# Patient Record
Sex: FEMALE | Race: WHITE | NOT HISPANIC OR LATINO | Employment: FULL TIME | URBAN - METROPOLITAN AREA
[De-identification: names, ages, dates, MRNs, and addresses within clinical notes are randomized per-mention and may not be internally consistent; named-entity substitution may affect disease eponyms.]

---

## 2017-01-11 ENCOUNTER — ALLSCRIPTS OFFICE VISIT (OUTPATIENT)
Dept: OTHER | Facility: OTHER | Age: 32
End: 2017-01-11

## 2017-01-17 ENCOUNTER — LAB CONVERSION - ENCOUNTER (OUTPATIENT)
Dept: OTHER | Facility: OTHER | Age: 32
End: 2017-01-17

## 2017-01-17 LAB
ADDITIONAL INFORMATION (HISTORICAL): NORMAL
ADEQUACY: (HISTORICAL): NORMAL
CLINICAL COMMENT (HISTORICAL): NORMAL
COMMENT (HISTORICAL): NORMAL
CYTOTECHNOLOGIST: (HISTORICAL): NORMAL
INTERPRETATION (HISTORICAL): NORMAL
LMP (HISTORICAL): NORMAL
PREV. BX: (HISTORICAL): NORMAL
PREV. PAP (HISTORICAL): NORMAL
REVIEWED BY (HISTORICAL): NORMAL
SOURCE (HISTORICAL): NORMAL

## 2018-01-15 VITALS
BODY MASS INDEX: 31.03 KG/M2 | WEIGHT: 186.25 LBS | SYSTOLIC BLOOD PRESSURE: 130 MMHG | HEIGHT: 65 IN | RESPIRATION RATE: 16 BRPM | DIASTOLIC BLOOD PRESSURE: 82 MMHG | HEART RATE: 72 BPM

## 2018-04-04 ENCOUNTER — OFFICE VISIT (OUTPATIENT)
Dept: OBGYN CLINIC | Facility: CLINIC | Age: 33
End: 2018-04-04
Payer: COMMERCIAL

## 2018-04-04 VITALS
HEART RATE: 77 BPM | BODY MASS INDEX: 30.05 KG/M2 | WEIGHT: 187 LBS | SYSTOLIC BLOOD PRESSURE: 109 MMHG | DIASTOLIC BLOOD PRESSURE: 79 MMHG | HEIGHT: 66 IN

## 2018-04-04 DIAGNOSIS — Z30.41 ENCOUNTER FOR SURVEILLANCE OF CONTRACEPTIVE PILLS: ICD-10-CM

## 2018-04-04 DIAGNOSIS — Z01.419 ENCOUNTER FOR GYNECOLOGICAL EXAMINATION WITHOUT ABNORMAL FINDING: Primary | ICD-10-CM

## 2018-04-04 DIAGNOSIS — A60.00 GENITAL HERPES SIMPLEX, UNSPECIFIED SITE: ICD-10-CM

## 2018-04-04 PROCEDURE — 99395 PREV VISIT EST AGE 18-39: CPT | Performed by: NURSE PRACTITIONER

## 2018-04-04 RX ORDER — NORETHINDRONE ACETATE AND ETHINYL ESTRADIOL 1MG-20(21)
1 KIT ORAL DAILY
Qty: 28 TABLET | Refills: 11 | Status: SHIPPED | OUTPATIENT
Start: 2018-04-04 | End: 2018-05-04

## 2018-04-04 RX ORDER — NORETHINDRONE ACETATE AND ETHINYL ESTRADIOL 1MG-20(21)
KIT ORAL
COMMUNITY
Start: 2017-01-11 | End: 2018-04-04 | Stop reason: SDUPTHER

## 2018-04-04 RX ORDER — VALACYCLOVIR HYDROCHLORIDE 500 MG/1
500 TABLET, FILM COATED ORAL DAILY
Qty: 30 TABLET | Refills: 11 | Status: SHIPPED | OUTPATIENT
Start: 2018-04-04 | End: 2018-05-04

## 2018-04-04 NOTE — PROGRESS NOTES
ASSESSMENT & PLAN: Briseyda Rapp is a 34 y o  G3 [id number] with normal gynecologic exam     1   Routine well woman exam done today  2  Pap and HPV:  The patient's last pap was 1/17/2017 and was negative  Pap and cotesting was not done today  Current ASCCP Guidelines reviewed  In 2020 pt will need pap and cotest done  3  The following were reviewed in today's visit: breast self exam, use and side effects of OCPs, adequate intake of calcium and vitamin D, exercise and healthy diet  4  Desires to continue oCP's, Junel 1/20, 1 pack with 11 refill sent to pharmacy  5   HSV, right labia, 1 small lesion that is healing,  patient is on suppression with Valtrex 500 mg daily  Prescription renewed  CC:  Annual Gynecologic Examination    HPI: Briseyda Rapp is a 34 y  o G3  who presents for annual gynecologic examination  She is on OCPs and desires to continue  She ran out of pills needs to restart, will restart the Sunday  Currently she is not sexually active but desires to be with her new partner  Reviewed consistent condom use  Currently has an HSV outbreak with 1 small lesion  She has a total of 3 lifetime outbreaks  Happy with daily suppression and wants to continue this treatment  Reports PMS symptoms the week before menses  Will try vitamin B complex and adequate calcium 1000mgs I diet or supplement and vitamin-D 3 1000 units daily  Patient will also increase her exercise  with  warmer weather approaching  Health Maintenance:    She wears her seatbelt routinely  She does perform irregular monthly self breast exams  She feels safe at home  Past Medical History:   Diagnosis Date    Broken arm     Herpes        Past Surgical History:   Procedure Laterality Date    APPENDECTOMY         Past OB/Gyn History:  OB History     No data available        Pt does not have menstrual issues  Regular with OCPs   History of sexually transmitted infection:Yes  History of abnormal pap smears:  No Patient is not currently sexually active  heterosexual   The current method of family planning is OCP (estrogen/progesterone)  Family History   Problem Relation Age of Onset    Heart defect Mother     Rheumatic fever Mother        Social History:  Social History     Social History    Marital status: Single     Spouse name: N/A    Number of children: N/A    Years of education: N/A     Occupational History    Not on file  Social History Main Topics    Smoking status: Never Smoker    Smokeless tobacco: Former User    Alcohol use Yes      Comment: occ    Drug use: No    Sexual activity: Not Currently     Partners: Male     Birth control/ protection: Pill     Other Topics Concern    Not on file     Social History Narrative    No narrative on file     Presently lives with her child  Patient is single  Patient is currently employed     Allergies   Allergen Reactions    Augmentin [Amoxicillin-Pot Clavulanate] Rash    Latex Rash    Sulfa Antibiotics Rash    Wound Dressing Adhesive Rash         Current Outpatient Prescriptions:     norethindrone-ethinyl estradiol (JUNEL FE 1/20) 1-20 MG-MCG per tablet, Take by mouth, Disp: , Rfl:     valACYclovir (VALTREX) 1,000 mg tablet, Take 1 tablet (1,000 mg total) by mouth 3 (three) times a day , Disp: 21 tablet, Rfl: 0      Review of Systems  Constitutional :no fever, feels well, no tiredness, no recent weight gain or loss  ENT: no ear ache, no loss of hearing, no nosebleeds or nasal discharge, no sore throat or hoarseness  Cardiovascular: no complaints of slow or fast heart beat, no chest pain, no palpitations, no leg claudication or lower extremity edema    Respiratory: no complaints of shortness of shortness of breath, no WINSTON  Breasts:no complaints of breast pain, breast lump, or nipple discharge  Gastrointestinal: no complaints of abdominal pain, constipation, nausea, vomiting, or diarrhea or bloody stools  Genitourinary : no complaints of dysuria, incontinence, pelvic pain, no dysmenorrhea, vaginal discharge or abnormal vaginal bleeding and as noted in HPI  Integumentary: no complaints of skin rash or lesion, itching or dry skin  Neurological: no complaints of headache, no confusion, no numbness or tingling, no dizziness or fainting    Objective      /79 (BP Location: Left arm, Patient Position: Sitting, Cuff Size: Adult)   Pulse 77   Ht 5' 6" (1 676 m)   Wt 84 8 kg (187 lb)   LMP 03/28/2018   Breastfeeding? No   BMI 30 18 kg/m²   General:   appears stated age, cooperative, alert normal mood and affect   Neck: normal, supple,trachea midline, no masses   Heart: regular rate and rhythm, S1, S2 normal, no murmur, click, rub or gallop   Lungs: clear to auscultation bilaterally   Breasts: normal appearance, no masses or tenderness, Inspection negative   Abdomen: soft, non-tender, without masses or organomegaly   Vulva: 1 small lesion on Rt labia that is healing   Vagina: normal vagina, no discharge, exudate, lesion, or erythema   Urethra: normal   Cervix: Normal, no discharge     Uterus: normal size, contour, position, consistency, mobility, non-tender, anteverted and mobile   Adnexa: normal adnexa

## 2018-08-09 ENCOUNTER — TELEPHONE (OUTPATIENT)
Dept: OBGYN CLINIC | Facility: CLINIC | Age: 33
End: 2018-08-09

## 2018-08-09 NOTE — TELEPHONE ENCOUNTER
Online message received from patient requesting a prescription for a UTI  Patient called , message left for patient informing her that we can see her today and to please call to schedule if she is still in need

## 2018-09-03 ENCOUNTER — OFFICE VISIT (OUTPATIENT)
Dept: URGENT CARE | Age: 33
End: 2018-09-03
Payer: COMMERCIAL

## 2018-09-03 VITALS
SYSTOLIC BLOOD PRESSURE: 120 MMHG | OXYGEN SATURATION: 99 % | HEIGHT: 65 IN | RESPIRATION RATE: 18 BRPM | DIASTOLIC BLOOD PRESSURE: 60 MMHG | HEART RATE: 82 BPM | TEMPERATURE: 98 F | WEIGHT: 188 LBS | BODY MASS INDEX: 31.32 KG/M2

## 2018-09-03 DIAGNOSIS — J06.9 UPPER RESPIRATORY TRACT INFECTION, UNSPECIFIED TYPE: Primary | ICD-10-CM

## 2018-09-03 DIAGNOSIS — J40 BRONCHITIS: ICD-10-CM

## 2018-09-03 PROCEDURE — 99213 OFFICE O/P EST LOW 20 MIN: CPT | Performed by: FAMILY MEDICINE

## 2018-09-03 RX ORDER — AZITHROMYCIN 250 MG/1
TABLET, FILM COATED ORAL
Qty: 6 TABLET | Refills: 0 | Status: SHIPPED | OUTPATIENT
Start: 2018-09-03 | End: 2018-09-08

## 2018-09-03 NOTE — PROGRESS NOTES
[email or web address]    NAME: Zay Bobby is a 34 y o  female  : 1985    MRN: [id number]  DATE: September 3, 2018  TIME: 11:14 AM    Assessment and Plan   Upper respiratory tract infection, unspecified type [J06 9]  1  Upper respiratory tract infection, unspecified type     2  Bronchitis  azithromycin (ZITHROMAX) 250 mg tablet       Patient Instructions     Patient Instructions   1  Take antibiotic as directed  2  Recommend daily probiotic while on antibiotic or eat yogurt with live cultures daily while on antibiotic  3  Push fluids  4  Plan follow up with your PCP as directed  Consider Allegra D for allergy / post nasal drip symptoms  Continue Tylenol or Advil for fever  Chief Complaint     Chief Complaint   Patient presents with    Fever     symptoms started about a week ago   Cough       History of Present Illness   Zay Bobby presents to the clinic c/o  37-year-old female with says she has been running a fever for over week  Cough started a couple days ago  Some postnasal drip  Some runny nose  She does have a history of herpetic lesions and sometimes that will cause her to run a fever but she has had no outbreaks  She works full-time in a   She has a 3year-old daughter  She is a nonsmoker  Review of Systems   Review of Systems   Constitutional: Positive for activity change, appetite change, chills and fever  HENT: Positive for congestion, postnasal drip and rhinorrhea  Eyes: Negative  Respiratory: Positive for cough  Negative for chest tightness, shortness of breath, wheezing and stridor  Cardiovascular: Positive for chest pain  Negative for palpitations and leg swelling         Current Medications     Long-Term Prescriptions   Medication Sig Dispense Refill    norethindrone-ethinyl estradiol (JUNEL FE 1/20) 1-20 MG-MCG per tablet Take 1 tablet by mouth daily for 30 days 28 tablet 11    valACYclovir (VALTREX) 500 mg tablet Take 1 tablet (500 mg total) by mouth daily for 30 days 30 tablet 11       Current Allergies     Allergies as of 2018 - Reviewed 2018   Allergen Reaction Noted    Augmentin [amoxicillin-pot clavulanate] Rash 2016    Latex Rash 2013    Sulfa antibiotics Rash 2013    Wound dressing adhesive Rash 2013          The following portions of the patient's history were reviewed and updated as appropriate: allergies, current medications, past family history, past medical history, past social history, past surgical history and problem list   Past Medical History:   Diagnosis Date    Broken arm     Herpes      Past Surgical History:   Procedure Laterality Date    APPENDECTOMY       SECTION  2014     Family History   Problem Relation Age of Onset    Heart defect Mother     Rheumatic fever Mother        Objective   /60 (BP Location: Left arm, Patient Position: Sitting, Cuff Size: Standard)   Pulse 82   Temp 98 °F (36 7 °C) (Temporal)   Resp 18   Ht 5' 5" (1 651 m)   Wt 85 3 kg (188 lb)   SpO2 99%   BMI 31 28 kg/m²        Physical Exam     Physical Exam   Constitutional: She is oriented to person, place, and time  She appears well-developed and well-nourished  No distress  Appears acutely ill but in no acute distress   HENT:   Head: Normocephalic and atraumatic  Right Ear: External ear normal    Left Ear: External ear normal    Cobblestoning of posterior pharynx with patchy redness  No exudate or fetid breath  Nasal turbinates red and edematous  No purulent mucus   Eyes: Conjunctivae and EOM are normal  Pupils are equal, round, and reactive to light  Right eye exhibits no discharge  Left eye exhibits no discharge  Neck: Normal range of motion  Neck supple  Cardiovascular: Normal rate, regular rhythm and normal heart sounds  Exam reveals no gallop and no friction rub  No murmur heard    Pulmonary/Chest: Effort normal and breath sounds normal  No respiratory distress  She has no wheezes  She has no rales  Lymphadenopathy:     She has no cervical adenopathy  Neurological: She is alert and oriented to person, place, and time  Skin: Skin is warm and dry  No rash noted  She is not diaphoretic  Psychiatric: She has a normal mood and affect  Nursing note and vitals reviewed

## 2018-09-03 NOTE — PATIENT INSTRUCTIONS
1  Take antibiotic as directed  2  Recommend daily probiotic while on antibiotic or eat yogurt with live cultures daily while on antibiotic  3  Push fluids  4  Plan follow up with your PCP as directed  Consider Allegra D for allergy / post nasal drip symptoms  Continue Tylenol or Advil for fever

## 2018-10-31 ENCOUNTER — OFFICE VISIT (OUTPATIENT)
Dept: URGENT CARE | Age: 33
End: 2018-10-31
Payer: COMMERCIAL

## 2018-10-31 VITALS
HEART RATE: 71 BPM | DIASTOLIC BLOOD PRESSURE: 72 MMHG | SYSTOLIC BLOOD PRESSURE: 138 MMHG | OXYGEN SATURATION: 98 % | HEIGHT: 66 IN | TEMPERATURE: 98.2 F | WEIGHT: 190 LBS | BODY MASS INDEX: 30.53 KG/M2 | RESPIRATION RATE: 18 BRPM

## 2018-10-31 DIAGNOSIS — H10.32 ACUTE BACTERIAL CONJUNCTIVITIS OF LEFT EYE: Primary | ICD-10-CM

## 2018-10-31 PROCEDURE — 99213 OFFICE O/P EST LOW 20 MIN: CPT | Performed by: FAMILY MEDICINE

## 2018-10-31 RX ORDER — TOBRAMYCIN 3 MG/ML
1 SOLUTION/ DROPS OPHTHALMIC
Qty: 1 BOTTLE | Refills: 0 | Status: SHIPPED | OUTPATIENT
Start: 2018-10-31

## 2018-10-31 NOTE — PROGRESS NOTES
[email or web address]        NAME: Gala Oquendo is a 34 y o  female  : 1985    MRN: [id number]  DATE: 2018  TIME: 2:54 PM    Assessment and Plan   Acute bacterial conjunctivitis of left eye [H10 32]  1  Acute bacterial conjunctivitis of left eye  tobramycin (TOBREX) 0 3 % SOLN         Patient Instructions     Patient Instructions   Start eye drops  Clean eye with clean cloth each time  Warm compresses  Follow up with eye doctor if no improvement  Go to ER with worsening symptoms, eye swelling or eye pain  Discard contacts  Chief Complaint     Chief Complaint   Patient presents with    Eye Problem     left eye lid red/swollen, painful and crusty this AM         History of Present Illness   Gala Oquendo presents to the clinic c/o    This is a 34year old female here today with complaints of redness and drainage from left eye  She state today she woke up with some discharge  She states she felt as though her eye was glued shut  She states she has change of vision when she has drainage from her eye  No eye pain  Daughter is currently being treated for cough with antibiotic  Review of Systems   Review of Systems   Constitutional: Negative  Eyes: Positive for discharge, redness and visual disturbance           Current Medications     Long-Term Prescriptions   Medication Sig Dispense Refill    norethindrone-ethinyl estradiol (JUNEL FE 1/20) 1-20 MG-MCG per tablet Take 1 tablet by mouth daily for 30 days 28 tablet 11    valACYclovir (VALTREX) 500 mg tablet Take 1 tablet (500 mg total) by mouth daily for 30 days 30 tablet 11       Current Allergies     Allergies as of 10/31/2018 - Reviewed 10/31/2018   Allergen Reaction Noted    Augmentin [amoxicillin-pot clavulanate] Rash 2016    Latex Rash 2013    Sulfa antibiotics Rash 2013    Wound dressing adhesive Rash 2013            The following portions of the patient's history were reviewed and updated as appropriate: allergies, current medications, past family history, past medical history, past social history, past surgical history and problem list     Objective   /72   Pulse 71   Temp 98 2 °F (36 8 °C) (Temporal)   Resp 18   Ht 5' 6" (1 676 m)   Wt 86 2 kg (190 lb)   LMP 10/24/2018   SpO2 98%   BMI 30 67 kg/m²        Physical Exam     Physical Exam   Constitutional: She appears well-developed and well-nourished  Eyes: Left eye exhibits exudate  Left conjunctiva is injected  Right pupil is round and reactive  Left pupil is round and reactive  Neck: Normal range of motion  Neck supple  Cardiovascular: Normal rate, regular rhythm and normal heart sounds  Pulmonary/Chest: Effort normal and breath sounds normal    Nursing note and vitals reviewed

## 2019-02-24 DIAGNOSIS — Z30.41 ENCOUNTER FOR SURVEILLANCE OF CONTRACEPTIVE PILLS: ICD-10-CM

## 2019-02-27 ENCOUNTER — TELEPHONE (OUTPATIENT)
Dept: OBGYN CLINIC | Facility: CLINIC | Age: 34
End: 2019-02-27

## 2019-02-27 NOTE — TELEPHONE ENCOUNTER
Called patient in regards to setting up an annual appointment (due 4/4/19) and asking if she needs BC refills  No answer left msg to call office back

## 2019-03-04 ENCOUNTER — TELEPHONE (OUTPATIENT)
Dept: OBGYN CLINIC | Facility: CLINIC | Age: 34
End: 2019-03-04

## 2019-03-04 RX ORDER — NORETHINDRONE ACETATE AND ETHINYL ESTRADIOL AND FERROUS FUMARATE 1MG-20(21)
KIT ORAL
Qty: 28 TABLET | Refills: 11 | OUTPATIENT
Start: 2019-03-04

## 2019-03-04 NOTE — TELEPHONE ENCOUNTER
Unable to reach patient  Called regarding setting up an appointment for annual exam/birthcontrol refill  No answer, left message to call office back